# Patient Record
Sex: MALE | Race: WHITE | NOT HISPANIC OR LATINO | Employment: FULL TIME | URBAN - METROPOLITAN AREA
[De-identification: names, ages, dates, MRNs, and addresses within clinical notes are randomized per-mention and may not be internally consistent; named-entity substitution may affect disease eponyms.]

---

## 2023-12-12 ENCOUNTER — HOSPITAL ENCOUNTER (EMERGENCY)
Facility: HOSPITAL | Age: 38
Discharge: HOME | End: 2023-12-12
Attending: INTERNAL MEDICINE
Payer: COMMERCIAL

## 2023-12-12 VITALS
WEIGHT: 255 LBS | HEART RATE: 86 BPM | HEIGHT: 73 IN | DIASTOLIC BLOOD PRESSURE: 88 MMHG | BODY MASS INDEX: 33.8 KG/M2 | RESPIRATION RATE: 18 BRPM | SYSTOLIC BLOOD PRESSURE: 152 MMHG | TEMPERATURE: 97 F | OXYGEN SATURATION: 97 %

## 2023-12-12 DIAGNOSIS — I10 HYPERTENSION, UNSPECIFIED TYPE: Primary | ICD-10-CM

## 2023-12-12 DIAGNOSIS — H69.91 DYSFUNCTION OF RIGHT EUSTACHIAN TUBE: ICD-10-CM

## 2023-12-12 LAB
ALBUMIN SERPL-MCNC: 4.5 G/DL (ref 3.5–5)
ALBUMIN/GLOB SERPL: 1.5 RATIO (ref 1.1–2)
ALP SERPL-CCNC: 40 UNIT/L (ref 40–150)
ALT SERPL-CCNC: 41 UNIT/L (ref 0–55)
APPEARANCE UR: CLEAR
AST SERPL-CCNC: 24 UNIT/L (ref 5–34)
BASOPHILS # BLD AUTO: 0.05 X10(3)/MCL
BASOPHILS NFR BLD AUTO: 0.4 %
BILIRUB SERPL-MCNC: 0.7 MG/DL
BILIRUB UR QL STRIP.AUTO: NEGATIVE
BUN SERPL-MCNC: 13 MG/DL (ref 8.9–20.6)
CALCIUM SERPL-MCNC: 9.7 MG/DL (ref 8.4–10.2)
CHLORIDE SERPL-SCNC: 103 MMOL/L (ref 98–107)
CO2 SERPL-SCNC: 26 MMOL/L (ref 22–29)
COLOR UR AUTO: YELLOW
CREAT SERPL-MCNC: 1.15 MG/DL (ref 0.73–1.18)
EOSINOPHIL # BLD AUTO: 0.03 X10(3)/MCL (ref 0–0.9)
EOSINOPHIL NFR BLD AUTO: 0.2 %
ERYTHROCYTE [DISTWIDTH] IN BLOOD BY AUTOMATED COUNT: 13.5 % (ref 11.5–17)
GFR SERPLBLD CREATININE-BSD FMLA CKD-EPI: >60 MLS/MIN/1.73/M2
GLOBULIN SER-MCNC: 3 GM/DL (ref 2.4–3.5)
GLUCOSE SERPL-MCNC: 118 MG/DL (ref 74–100)
GLUCOSE UR QL STRIP.AUTO: NEGATIVE
HCT VFR BLD AUTO: 45.3 % (ref 42–52)
HGB BLD-MCNC: 15 G/DL (ref 14–18)
IMM GRANULOCYTES # BLD AUTO: 0.06 X10(3)/MCL (ref 0–0.04)
IMM GRANULOCYTES NFR BLD AUTO: 0.4 %
KETONES UR QL STRIP.AUTO: NEGATIVE
LEUKOCYTE ESTERASE UR QL STRIP.AUTO: NEGATIVE
LYMPHOCYTES # BLD AUTO: 1.13 X10(3)/MCL (ref 0.6–4.6)
LYMPHOCYTES NFR BLD AUTO: 8 %
MCH RBC QN AUTO: 26.1 PG (ref 27–31)
MCHC RBC AUTO-ENTMCNC: 33.1 G/DL (ref 33–36)
MCV RBC AUTO: 78.9 FL (ref 80–94)
MONOCYTES # BLD AUTO: 0.67 X10(3)/MCL (ref 0.1–1.3)
MONOCYTES NFR BLD AUTO: 4.7 %
NEUTROPHILS # BLD AUTO: 12.23 X10(3)/MCL (ref 2.1–9.2)
NEUTROPHILS NFR BLD AUTO: 86.3 %
NITRITE UR QL STRIP.AUTO: NEGATIVE
PH UR STRIP.AUTO: 6 [PH]
PLATELET # BLD AUTO: 255 X10(3)/MCL (ref 130–400)
PMV BLD AUTO: 9.9 FL (ref 7.4–10.4)
POTASSIUM SERPL-SCNC: 4.4 MMOL/L (ref 3.5–5.1)
PROT SERPL-MCNC: 7.5 GM/DL (ref 6.4–8.3)
PROT UR QL STRIP.AUTO: NEGATIVE
RBC # BLD AUTO: 5.74 X10(6)/MCL (ref 4.7–6.1)
RBC UR QL AUTO: NEGATIVE
SODIUM SERPL-SCNC: 138 MMOL/L (ref 136–145)
SP GR UR STRIP.AUTO: 1.01 (ref 1–1.03)
UROBILINOGEN UR STRIP-ACNC: 0.2
WBC # SPEC AUTO: 14.17 X10(3)/MCL (ref 4.5–11.5)

## 2023-12-12 PROCEDURE — 99283 EMERGENCY DEPT VISIT LOW MDM: CPT

## 2023-12-12 PROCEDURE — 81003 URINALYSIS AUTO W/O SCOPE: CPT | Performed by: INTERNAL MEDICINE

## 2023-12-12 PROCEDURE — 85025 COMPLETE CBC W/AUTO DIFF WBC: CPT | Performed by: INTERNAL MEDICINE

## 2023-12-12 PROCEDURE — 80053 COMPREHEN METABOLIC PANEL: CPT | Performed by: INTERNAL MEDICINE

## 2023-12-12 RX ORDER — LORATADINE 10 MG/1
10 TABLET ORAL DAILY
Qty: 15 TABLET | Refills: 0 | Status: SHIPPED | OUTPATIENT
Start: 2023-12-12 | End: 2023-12-27

## 2023-12-12 RX ORDER — LISINOPRIL 20 MG/1
20 TABLET ORAL DAILY
COMMUNITY
Start: 2023-12-11

## 2023-12-12 NOTE — DISCHARGE INSTRUCTIONS
Make sure to take your blood pressure medicine around the same time in the day and just monitor and make a list of blood pressure readings and see your family doctor to adjust the blood pressure medicine as needed.    It is important that you see your family doctor in one to 2 days to reevaluate and to start you on blood pressure medication if needed    If you're already on blood pressure medication, you need to see your family doctor in one to 2 days to reevaluate and adjust the dose of medication as needed    You must take your blood pressure medicine regularly as uncontrolled blood pressure can cause major health conditions and problems like Stroke, Heart Attacks, Kidney Failure etc..          Take medicines as prescribed    See your family doctor in one to 2 days for further evaluation, workup, and treatment as necessary    Avoid driving or operating machinery while taking medicines as some medicines might cause drowsiness and may cause problems. Also pain medicines have potential of being addictive  so use Pain meds specially Narcotics Sparingly.    The exam and treatment you received in Emergency Room was for an urgent problem and NOT INTENDED AS COMPLETE CARE. It is important that you FOLLOW UP with a doctor for ongoing care. If your symptoms become WORSE or you DO NOT IMPROVE and you are unable to reach your health care provider, you should RETURN to the emergency department. The Emergency Room doctor has provided a PRELIMINARY INTERPRETATION of all your STUDIES. A final interpretation may be done after you are discharged. IF A CHANGE in your diagnosis or treatment is needed WE WILL CONTACT YOU. It is critical that we have a CURRENT PHONE NUMBER FOR YOU.

## 2023-12-12 NOTE — Clinical Note
"Cesar Blood (Ryan) was seen and treated in our emergency department on 12/12/2023.  He may return to work on 12/13/2023.  May be excused from work on 12/12/23     If you have any questions or concerns, please don't hesitate to call.       RN    "

## 2023-12-12 NOTE — ED PROVIDER NOTES
Encounter Date: 12/12/2023  History from patient     History     Chief Complaint   Patient presents with    Hypertension     States he was dx with HTN yesterday and placed on Lisinopril which he is taking at night, reports BP at 0940 today was 190/110     HPI    Cesar Blood is 38 y.o. male who  has a past medical history of Hypertension. arrives in ER with c/o Hypertension (States he was dx with HTN yesterday and placed on Lisinopril which he is taking at night, reports BP at 0940 today was 190/110)      Review of patient's allergies indicates:  No Known Allergies  Past Medical History:   Diagnosis Date    Hypertension      History reviewed. No pertinent surgical history.  Family History   Problem Relation Age of Onset    Diabetes Mother     Heart disease Father      Social History     Tobacco Use    Smoking status: Every Day     Types: Cigarettes    Smokeless tobacco: Never   Substance Use Topics    Alcohol use: Not Currently    Drug use: Never     Review of Systems   Constitutional:  Negative for fever.   HENT:  Negative for trouble swallowing and voice change.         Fullness in the right ear   Eyes:  Negative for visual disturbance.   Respiratory:  Negative for cough and shortness of breath.    Cardiovascular:  Negative for chest pain.   Gastrointestinal:  Negative for abdominal pain, diarrhea and vomiting.   Genitourinary:  Negative for dysuria and hematuria.   Musculoskeletal:  Negative for gait problem.        No Pain.   Skin:  Negative for color change and rash.   Neurological:  Negative for headaches.   Psychiatric/Behavioral:  Negative for behavioral problems and sleep disturbance.    All other systems reviewed and are negative.      Physical Exam     Initial Vitals [12/12/23 1049]   BP Pulse Resp Temp SpO2   (!) 170/90 97 18 97.3 °F (36.3 °C) 99 %      MAP       --         Physical Exam    Nursing note and vitals reviewed.  Constitutional: He appears well-developed and well-nourished. No distress.    HENT:   Head: Atraumatic.   Fluid behind the right eardrum,   Eyes: EOM are normal. Pupils are equal, round, and reactive to light.   Neck: Neck supple.   Cardiovascular:  Normal rate, regular rhythm and normal heart sounds.           Pulmonary/Chest: Breath sounds normal.   Abdominal: Abdomen is soft. Bowel sounds are normal.   Musculoskeletal:         General: Normal range of motion.      Cervical back: Neck supple. No bony tenderness.     Neurological: He is alert and oriented to person, place, and time. GCS score is 15. GCS eye subscore is 4. GCS verbal subscore is 5. GCS motor subscore is 6.   Speech Normal   Skin: Skin is dry.   Psychiatric: He has a normal mood and affect.   Pleasant         ED Course   Procedures  Orders Placed This Encounter   Procedures    Comprehensive metabolic panel    CBC auto differential    Urinalysis, Reflex to Urine Culture    CBC with Differential    POCT Glucose, Hand-Held Device     Medications - No data to display  Admission on 12/12/2023   Component Date Value Ref Range Status    Sodium Level 12/12/2023 138  136 - 145 mmol/L Final    Potassium Level 12/12/2023 4.4  3.5 - 5.1 mmol/L Final    Chloride 12/12/2023 103  98 - 107 mmol/L Final    Carbon Dioxide 12/12/2023 26  22 - 29 mmol/L Final    Glucose Level 12/12/2023 118 (H)  74 - 100 mg/dL Final    Blood Urea Nitrogen 12/12/2023 13.0  8.9 - 20.6 mg/dL Final    Creatinine 12/12/2023 1.15  0.73 - 1.18 mg/dL Final    Calcium Level Total 12/12/2023 9.7  8.4 - 10.2 mg/dL Final    Protein Total 12/12/2023 7.5  6.4 - 8.3 gm/dL Final    Albumin Level 12/12/2023 4.5  3.5 - 5.0 g/dL Final    Globulin 12/12/2023 3.0  2.4 - 3.5 gm/dL Final    Albumin/Globulin Ratio 12/12/2023 1.5  1.1 - 2.0 ratio Final    Bilirubin Total 12/12/2023 0.7  <=1.5 mg/dL Final    Alkaline Phosphatase 12/12/2023 40  40 - 150 unit/L Final    Alanine Aminotransferase 12/12/2023 41  0 - 55 unit/L Final    Aspartate Aminotransferase 12/12/2023 24  5 - 34 unit/L  Final    eGFR 12/12/2023 >60  mls/min/1.73/m2 Final    Color, UA 12/12/2023 Yellow  Yellow, Light-Yellow, Dark Yellow, Noemi, Straw Final    Appearance, UA 12/12/2023 Clear  Clear Final    Specific Gravity, UA 12/12/2023 1.010  1.005 - 1.030 Final    pH, UA 12/12/2023 6.0  5.0 - 8.5 Final    Protein, UA 12/12/2023 Negative  Negative Final    Glucose, UA 12/12/2023 Negative  Negative, Normal Final    Ketones, UA 12/12/2023 Negative  Negative Final    Blood, UA 12/12/2023 Negative  Negative Final    Bilirubin, UA 12/12/2023 Negative  Negative Final    Urobilinogen, UA 12/12/2023 0.2  0.2, 1.0, Normal Final    Nitrites, UA 12/12/2023 Negative  Negative Final    Leukocyte Esterase, UA 12/12/2023 Negative  Negative Final    WBC 12/12/2023 14.17 (H)  4.50 - 11.50 x10(3)/mcL Final    RBC 12/12/2023 5.74  4.70 - 6.10 x10(6)/mcL Final    Hgb 12/12/2023 15.0  14.0 - 18.0 g/dL Final    Hct 12/12/2023 45.3  42.0 - 52.0 % Final    MCV 12/12/2023 78.9 (L)  80.0 - 94.0 fL Final    MCH 12/12/2023 26.1 (L)  27.0 - 31.0 pg Final    MCHC 12/12/2023 33.1  33.0 - 36.0 g/dL Final    RDW 12/12/2023 13.5  11.5 - 17.0 % Final    Platelet 12/12/2023 255  130 - 400 x10(3)/mcL Final    MPV 12/12/2023 9.9  7.4 - 10.4 fL Final    Neut % 12/12/2023 86.3  % Final    Lymph % 12/12/2023 8.0  % Final    Mono % 12/12/2023 4.7  % Final    Eos % 12/12/2023 0.2  % Final    Basophil % 12/12/2023 0.4  % Final    Lymph # 12/12/2023 1.13  0.6 - 4.6 x10(3)/mcL Final    Neut # 12/12/2023 12.23 (H)  2.1 - 9.2 x10(3)/mcL Final    Mono # 12/12/2023 0.67  0.1 - 1.3 x10(3)/mcL Final    Eos # 12/12/2023 0.03  0 - 0.9 x10(3)/mcL Final    Baso # 12/12/2023 0.05  <=0.2 x10(3)/mcL Final    IG# 12/12/2023 0.06 (H)  0 - 0.04 x10(3)/mcL Final    IG% 12/12/2023 0.4  % Final       Labs Reviewed   COMPREHENSIVE METABOLIC PANEL - Abnormal; Notable for the following components:       Result Value    Glucose Level 118 (*)     All other components within normal limits   CBC WITH  DIFFERENTIAL - Abnormal; Notable for the following components:    WBC 14.17 (*)     MCV 78.9 (*)     MCH 26.1 (*)     Neut # 12.23 (*)     IG# 0.06 (*)     All other components within normal limits   URINALYSIS, REFLEX TO URINE CULTURE - Normal   CBC W/ AUTO DIFFERENTIAL    Narrative:     The following orders were created for panel order CBC auto differential.  Procedure                               Abnormality         Status                     ---------                               -----------         ------                     CBC with Differential[[id number]]       Abnormal            Final result                 Please view results for these tests on the individual orders.   POCT GLUCOSE, HAND-HELD DEVICE          Imaging Results    None          Medications - No data to display  Medical Decision Making    Cesar Blood is 38 y.o. male who  has a past medical history of Hypertension. arrives in ER with c/o Hypertension (States he was dx with HTN yesterday and placed on Lisinopril which he is taking at night, reports BP at 0940 today was 190/110)      Essentially patient says that yesterday he had some fullness in the right ear went to the urgent care where they informed him that he has hypertension, he was prescribed lisinopril, he was given 20 mg of lisinopril in the urgent Care, he was prescribed clonidine and meclizine and lisinopril to take it once a day, he says he took the dose of lisinopril, this morning his blood pressure was down to 130, later on he felt a little fullness in the ear so he is blood pressure again was 160 then 30 minutes later his blood pressure went to 180 so decided to come to the emergency room.    He did take some meclizine 50 mg also.    On examination patient does have some fluid behind the right ear, and it does not move on Valsalva maneuver, essentially has eustachian tube dysfunction, I have advised him not to take the blood pressure medicine every 2-3 hours.  He was  prescribed clonidine which was supposed to take if the blood pressure is more than 160/100, but he could not find it in the pharmacy so he did not take it.    Amount and/or Complexity of Data Reviewed  Labs: ordered.    Risk  OTC drugs.               ED Course as of 12/12/23 1259   Tue Dec 12, 2023   1256 BP(!): 144/86 [GQ]   1257 Pulse: 78 [GQ]   1257 SpO2: 95 %  Patient's kidney function is normal, does not have any other major abnormality, I will let him go home. [GQ]   1257 I have advised him not to take the blood pressure medicine constantly, just take 1 dose a day and monitor blood pressure make a chart and then let the family doctor adjust the medicine further depending on the response from the medication which will take a few days to get to its maximal effect. [GQ]      ED Course User Index  [GQ] Delmar De Jesus MD                           Clinical Impression:  Final diagnoses:  [I10] Hypertension, unspecified type (Primary)  [H69.91] Dysfunction of right eustachian tube          ED Disposition Condition    Discharge Stable          ED Prescriptions       Medication Sig Dispense Start Date End Date Auth. Provider    loratadine (CLARITIN) 10 mg tablet Take 1 tablet (10 mg total) by mouth once daily. for 15 days 15 tablet 12/12/2023 12/27/2023 Delmar De Jesus MD          Follow-up Information       Follow up With Specialties Details Why Contact Info    PMD  In 2 days            Delmar De Jesus MD.  Emergency Medicine    12/12/2023 12:59 PM     This Note/Report was created with the assistance of  voice recognition software or phone  dictation.  There may be transcription errors as a result of using this technology however minimal. Effort has been made to assure accuracy of transcription but any obvious errors or omissions should be clarified with the author of the document.        Delmar De Jesus MD  12/12/23 2291

## 2023-12-20 LAB — POCT GLUCOSE: 104 MG/DL (ref 70–110)
